# Patient Record
Sex: MALE | Race: WHITE | NOT HISPANIC OR LATINO
[De-identification: names, ages, dates, MRNs, and addresses within clinical notes are randomized per-mention and may not be internally consistent; named-entity substitution may affect disease eponyms.]

---
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
## 2023-01-01 NOTE — H&P PST PEDIATRIC - URINARY CATHETER
Mom called said she was told she needed a Screening for hearing Referral so she can go ahead and schedule the appointment. When done she would like a call letting her know the referral is in. To be faxed to [facility] in [place] at [phone]   no

## 2023-09-18 NOTE — ASU PATIENT PROFILE, PEDIATRIC - AGE
Notification Instructions: Patient will be notified of pathology results. However, patient instructed to call the office if not contacted within 2 weeks. (1) 13 years and above

## 2024-01-08 ENCOUNTER — NON-APPOINTMENT (OUTPATIENT)
Age: 19
End: 2024-01-08